# Patient Record
Sex: MALE | Race: WHITE | NOT HISPANIC OR LATINO | Employment: FULL TIME | URBAN - METROPOLITAN AREA
[De-identification: names, ages, dates, MRNs, and addresses within clinical notes are randomized per-mention and may not be internally consistent; named-entity substitution may affect disease eponyms.]

---

## 2017-02-04 ENCOUNTER — OFFICE VISIT (OUTPATIENT)
Dept: URGENT CARE | Facility: URGENT CARE | Age: 42
End: 2017-02-04
Payer: COMMERCIAL

## 2017-02-04 VITALS
OXYGEN SATURATION: 95 % | TEMPERATURE: 98.9 F | HEART RATE: 73 BPM | SYSTOLIC BLOOD PRESSURE: 116 MMHG | BODY MASS INDEX: 26.56 KG/M2 | WEIGHT: 182.4 LBS | DIASTOLIC BLOOD PRESSURE: 90 MMHG

## 2017-02-04 DIAGNOSIS — A08.4 VIRAL ENTERITIS: Primary | ICD-10-CM

## 2017-02-04 PROCEDURE — 99212 OFFICE O/P EST SF 10 MIN: CPT | Performed by: INTERNAL MEDICINE

## 2017-02-04 NOTE — PROGRESS NOTES
"Chief Complaint   Patient presents with     Urgent Care     Diarrhea     onset thurs night.  fever of 100.9 yesterday.  Fever and diarrhea subsided since am.         Initial /90 mmHg  Pulse 73  Temp(Src) 98.9  F (37.2  C) (Oral)  Wt 182 lb 6.4 oz (82.736 kg)  SpO2 95% Estimated body mass index is 26.56 kg/(m^2) as calculated from the following:    Height as of 3/16/16: 5' 9.5\" (1.765 m).    Weight as of this encounter: 182 lb 6.4 oz (82.736 kg).  BP completed using cuff size: jamal Marie CMA                                2/4/2017 10:08 AM       "

## 2017-02-04 NOTE — PATIENT INSTRUCTIONS
Viral Gastroenteritis (Adult)    Gastroenteritis is commonly called the stomach flu. It is most often caused by a virus that affects the stomach and intestinal tract and usually lasts from 2 to 7 days. Common viruses causing gastroenteritis include norovirus, rotavirus, and hepatitis A. Non-viral causes of gastroenteritis include bacteria, parasites, and toxins.  The danger from repeated vomiting or diarrhea is dehydration. This is the loss of too much fluid from the body. When this occurs, body fluids must be replaced. Antibiotics do not help with this illness because it is usually viral.Simple home treatment will be helpful.  Symptoms of viral gastroenteritis may include:    Watery, loose stools    Stomach pain or abdominal cramps    Fever and chills    Nausea and vomiting    Loss of bowel control    Headache  Home care  Gastroenteritis is transmitted by contact with the stool or vomit of an infected person. This can occur from person to person or from contact with a contaminated surface.  Follow these guidelines when caring for yourself at home:    If symptoms are severe, rest at home for the next 24 hours or until you are feeling better.    Wash your hands with soap and water or use alcohol-based  to prevent the spread of infection. Wash your hands after touching anyone who is sick.    Wash your hands or use alcohol-based  after using the toilet and before meals. Clean the toilet after each use.  Remember these tips when preparing food:    People with diarrhea should not prepare or serve food to others. When preparing foods, wash your hands before and after.    Wash your hands after using cutting boards, countertops, knives, or utensils that have been in contact with raw food.    Keep uncooked meats away from cooked and ready-to-eat foods.  Medicine  You may use acetaminophen or NSAID medicines like ibuprofen or naproxen to control fever unless another medicine was given. If you have chronic  liver or kidney disease, talk with your healthcare provider before using these medicines. Also talk with your provider if you've had a stomach ulcer or gastrointestinal bleeding. Don't give aspirin to anyone under 18 years of age who is ill with a fever. It may cause severe liver damage. Don't use NSAIDS is you are already taking one for another condition (like arthritis) or are on aspirin (such as for heart disease or after a stroke).  If medicine for vomiting or diarrhea are prescribed, take these only as directed. Do not take over-the-counter medicines for vomiting or diarrhea unless instructed by your healthcare provider.  Diet  Follow these guidelines for food:    Water and liquids are important so you don't get dehydrated. Drink a small amount at a time or suck on ice chips if you are vomiting.    If you eat, avoid fatty, greasy, spicy, or fried foods.    Don't eat dairy if you have diarrhea. This can make diarrhea worse.    Avoid tobacco, alcohol, and caffeine which may worsen symptoms.  During the first 24 hours (the first full day), follow the diet below:    Beverages. Sports drinks, soft drinks without caffeine, ginger ale, mineral water (plain or flavored), decaffeinated tea and coffee. If you are very dehydrated, sports drinks aren't a good choice. They have too much sugar and not enough electrolytes. In this case, commercially available products called oral rehydration solutions, are best.    Soups. Eat clear broth, consommé, and bouillon.    Desserts. Eat gelatin, popsicles, and fruit juice bars.  During the next 24 hours (the second day), you may add the following to the above:    Hot cereal, plain toast, bread, rolls, and crackers    Plain noodles, rice, mashed potatoes, chicken noodle or rice soup    Unsweetened canned fruit (avoid pineapple), bananas    Limit fat intake to less than 15 grams per day. Do this by avoiding margarine, butter, oils, mayonnaise, sauces, gravies, fried foods, peanut  butter, meat, poultry, and fish.    Limit fiber and avoid raw or cooked vegetables, fresh fruits (except bananas), and bran cereals.    Limit caffeine and chocolate. Don't use spices or seasonings other than salt.    Limit dairy products.    Avoid alcohol.  During the next 24 hours:    Gradually resume a normal diet as you feel better and your symptoms improve.    If at any time it starts getting worse again, go back to clear liquids until you feel better.  Follow-up care  Follow up with your healthcare provider, or as advised. Call your provider if you don't get better within 24 hours or if diarrhea lasts more than a week. Also follow up if you are unable to keep down liquids and get dehydrated. If a stool (diarrhea) sample was taken, call as directed for the results.  Call 911  Call 911 if any of these occur:    Trouble breathing    Chest pain    Confused    Severe drowsiness or trouble awakening    Fainting or loss of consciousness    Rapid heart rate    Seizure    Stiff neck  When to seek medical advice  Call your healthcare provider right away if any of these occur:    Abdominal pain that gets worse    Continued vomiting (unable to keep liquids down)    Frequent diarrhea (more than 5 times a day)    Blood in vomit or stool (black or red color)    Dark urine, reduced urine output, or extreme thirst    Weakness or dizziness    Drowsiness    Fever of 100.4 F (38 C) oral or higher that does not get better with fever medicine    New rash    0573-8199 [employer]. [address] [place] [address], [place] [address]. All rights reserved. This information is not intended as a substitute for professional medical care. Always follow your healthcare professional's instructions.

## 2017-02-04 NOTE — PROGRESS NOTES
SUBJECTIVE:  Timbo Rocha, a 41 year old male, presents for evaluation of diarrhea.  This started two days ago.  Was frequent through the night.  Associated fever 38.3 C.  Fever has not recurred in the past 24 hours. In the past 24 hours, no bowel movements.  No vomiting in the past 24 hours.  No blood or mucus in the stool.  He had some slight abdominal pain which is now reduced. He has been drinking fluids over the past several days.  Denies similar illness in the house.  No international travel.      OBJECTIVE:  /90 mmHg  Pulse 73  Temp(Src) 98.9  F (37.2  C) (Oral)  Wt 182 lb 6.4 oz (82.736 kg)  SpO2 95%  GENERAL: healthy, alert and no distress  HENT: ear canals and TM's normal and nose and mouth without ulcers or lesions  NECK: no adenopathy, no asymmetry, masses, or scars and thyroid normal to palpation  RESP: clear to auscultation and percussion bilaterally; normal I:E ratio  CV: regular rates and rhythm, normal S1 S2, no S3 or S4 and no murmur, click or rub -  ABDOMEN: soft, nontender, without hepatosplenomegaly or masses and bowel sounds normal    ASSESSMENT/PLAN:    ICD-10-CM    1. Viral enteritis A08.4 Resolving spontaneously.  BRAT diet and ADAT.           Han Busch MD

## 2017-02-04 NOTE — MR AVS SNAPSHOT
After Visit Summary   2/4/2017    Timbo Rocha    MRN: [id number]           Patient Information     Date Of Birth          1975        Visit Information        Provider Department      2/4/2017 9:45 AM Han Busch MD [facility]        Today's Diagnoses     Viral enteritis    -  1       Care Instructions      Viral Gastroenteritis (Adult)    Gastroenteritis is commonly called the stomach flu. It is most often caused by a virus that affects the stomach and intestinal tract and usually lasts from 2 to 7 days. Common viruses causing gastroenteritis include norovirus, rotavirus, and hepatitis A. Non-viral causes of gastroenteritis include bacteria, parasites, and toxins.  The danger from repeated vomiting or diarrhea is dehydration. This is the loss of too much fluid from the body. When this occurs, body fluids must be replaced. Antibiotics do not help with this illness because it is usually viral.Simple home treatment will be helpful.  Symptoms of viral gastroenteritis may include:    Watery, loose stools    Stomach pain or abdominal cramps    Fever and chills    Nausea and vomiting    Loss of bowel control    Headache  Home care  Gastroenteritis is transmitted by contact with the stool or vomit of an infected person. This can occur from person to person or from contact with a contaminated surface.  Follow these guidelines when caring for yourself at home:    If symptoms are severe, rest at home for the next 24 hours or until you are feeling better.    Wash your hands with soap and water or use alcohol-based  to prevent the spread of infection. Wash your hands after touching anyone who is sick.    Wash your hands or use alcohol-based  after using the toilet and before meals. Clean the toilet after each use.  Remember these tips when preparing food:    People with diarrhea should not prepare or serve food to others. When preparing foods, wash your hands  before and after.    Wash your hands after using cutting boards, countertops, knives, or utensils that have been in contact with raw food.    Keep uncooked meats away from cooked and ready-to-eat foods.  Medicine  You may use acetaminophen or NSAID medicines like ibuprofen or naproxen to control fever unless another medicine was given. If you have chronic liver or kidney disease, talk with your healthcare provider before using these medicines. Also talk with your provider if you've had a stomach ulcer or gastrointestinal bleeding. Don't give aspirin to anyone under 18 years of age who is ill with a fever. It may cause severe liver damage. Don't use NSAIDS is you are already taking one for another condition (like arthritis) or are on aspirin (such as for heart disease or after a stroke).  If medicine for vomiting or diarrhea are prescribed, take these only as directed. Do not take over-the-counter medicines for vomiting or diarrhea unless instructed by your healthcare provider.  Diet  Follow these guidelines for food:    Water and liquids are important so you don't get dehydrated. Drink a small amount at a time or suck on ice chips if you are vomiting.    If you eat, avoid fatty, greasy, spicy, or fried foods.    Don't eat dairy if you have diarrhea. This can make diarrhea worse.    Avoid tobacco, alcohol, and caffeine which may worsen symptoms.  During the first 24 hours (the first full day), follow the diet below:    Beverages. Sports drinks, soft drinks without caffeine, ginger ale, mineral water (plain or flavored), decaffeinated tea and coffee. If you are very dehydrated, sports drinks aren't a good choice. They have too much sugar and not enough electrolytes. In this case, commercially available products called oral rehydration solutions, are best.    Soups. Eat clear broth, consommé, and bouillon.    Desserts. Eat gelatin, popsicles, and fruit juice bars.  During the next 24 hours (the second day), you may add  the following to the above:    Hot cereal, plain toast, bread, rolls, and crackers    Plain noodles, rice, mashed potatoes, chicken noodle or rice soup    Unsweetened canned fruit (avoid pineapple), bananas    Limit fat intake to less than 15 grams per day. Do this by avoiding margarine, butter, oils, mayonnaise, sauces, gravies, fried foods, peanut butter, meat, poultry, and fish.    Limit fiber and avoid raw or cooked vegetables, fresh fruits (except bananas), and bran cereals.    Limit caffeine and chocolate. Don't use spices or seasonings other than salt.    Limit dairy products.    Avoid alcohol.  During the next 24 hours:    Gradually resume a normal diet as you feel better and your symptoms improve.    If at any time it starts getting worse again, go back to clear liquids until you feel better.  Follow-up care  Follow up with your healthcare provider, or as advised. Call your provider if you don't get better within 24 hours or if diarrhea lasts more than a week. Also follow up if you are unable to keep down liquids and get dehydrated. If a stool (diarrhea) sample was taken, call as directed for the results.  Call 911  Call 911 if any of these occur:    Trouble breathing    Chest pain    Confused    Severe drowsiness or trouble awakening    Fainting or loss of consciousness    Rapid heart rate    Seizure    Stiff neck  When to seek medical advice  Call your healthcare provider right away if any of these occur:    Abdominal pain that gets worse    Continued vomiting (unable to keep liquids down)    Frequent diarrhea (more than 5 times a day)    Blood in vomit or stool (black or red color)    Dark urine, reduced urine output, or extreme thirst    Weakness or dizziness    Drowsiness    Fever of 100.4 F (38 C) oral or higher that does not get better with fever medicine    New rash    [id number] [employer]. [address] [place] [address], [place] [address]. All rights reserved. This information is not  "intended as a substitute for professional medical care. Always follow your healthcare professional's instructions.              Follow-ups after your visit        Who to contact     If you have questions or need follow up information about today's clinic visit or your schedule please contact [facility] directly at [phone].  Normal or non-critical lab and imaging results will be communicated to you by MyChart, letter or phone within 4 business days after the clinic has received the results. If you do not hear from us within 7 days, please contact the clinic through Volteahart or phone. If you have a critical or abnormal lab result, we will notify you by phone as soon as possible.  Submit refill requests through [email or web address] or call your pharmacy and they will forward the refill request to us. Please allow 3 business days for your refill to be completed.          Additional Information About Your Visit        MyChart Information     [email or web address] lets you send messages to your doctor, view your test results, renew your prescriptions, schedule appointments and more. To sign up, go to [email or web address] . Click on \"Log in\" on the left side of the screen, which will take you to the Welcome page. Then click on \"Sign up Now\" on the right side of the page.     You will be asked to enter the access code listed below, as well as some personal information. Please follow the directions to create your username and password.     Your access code is: [email or web address]  Expires: 2017 10:31 AM     Your access code will  in 90 days. If you need help or a new code, please call your [place] clinic or [phone].        Care EveryWhere ID     This is your Care EveryWhere ID. This could be used by other organizations to access your [place] medical records  [id number]        Your Vitals Were     Pulse Temperature Pulse Oximetry             73 98.9  F (37.2  C) (Oral) 95%          Blood Pressure from Last 3 " Encounters:   02/04/17 116/90   03/16/16 114/74   09/13/15 110/70    Weight from Last 3 Encounters:   02/04/17 182 lb 6.4 oz (82.736 kg)   03/16/16 179 lb 1.6 oz (81.239 kg)   09/13/15 176 lb (79.833 kg)              Today, you had the following     No orders found for display       Primary Care Provider    None       No address on file        Thank you!     Thank you for choosing [facility]  for your care. Our goal is always to provide you with excellent care. Hearing back from our patients is one way we can continue to improve our services. Please take a few minutes to complete the written survey that you may receive in the mail after your visit with us. Thank you!             Your Updated Medication List - Protect others around you: Learn how to safely use, store and throw away your medicines at www.disposemymeds.org.          This list is accurate as of: 2/4/17 10:31 AM.  Always use your most recent med list.                   Brand Name Dispense Instructions for use    temazepam 15 MG capsule    RESTORIL    30 capsule    Take by mouth nightly as needed for sleep

## 2017-11-08 ENCOUNTER — OFFICE VISIT (OUTPATIENT)
Dept: PEDIATRICS | Facility: CLINIC | Age: 42
End: 2017-11-08
Payer: COMMERCIAL

## 2017-11-08 VITALS
HEIGHT: 69 IN | OXYGEN SATURATION: 98 % | TEMPERATURE: 97.6 F | DIASTOLIC BLOOD PRESSURE: 76 MMHG | SYSTOLIC BLOOD PRESSURE: 128 MMHG | HEART RATE: 55 BPM | BODY MASS INDEX: 26.72 KG/M2 | WEIGHT: 180.4 LBS

## 2017-11-08 DIAGNOSIS — F51.01 PRIMARY INSOMNIA: Primary | ICD-10-CM

## 2017-11-08 PROCEDURE — 99213 OFFICE O/P EST LOW 20 MIN: CPT | Mod: GE | Performed by: INTERNAL MEDICINE

## 2017-11-08 RX ORDER — TEMAZEPAM 15 MG/1
15 CAPSULE ORAL
Qty: 30 CAPSULE | Refills: 0 | Status: SHIPPED | OUTPATIENT
Start: 2017-11-08 | End: 2023-10-10

## 2017-11-08 NOTE — NURSING NOTE
"Chief Complaint   Patient presents with     Sleep Problem       Initial /76 (BP Location: Right arm, Patient Position: Chair, Cuff Size: Adult Regular)  Pulse 55  Temp 97.6  F (36.4  C) (Tympanic)  Ht 5' 9.25\" (1.759 m)  Wt 180 lb 6.4 oz (81.8 kg)  SpO2 98%  BMI 26.45 kg/m2 Estimated body mass index is 26.45 kg/(m^2) as calculated from the following:    Height as of this encounter: 5' 9.25\" (1.759 m).    Weight as of this encounter: 180 lb 6.4 oz (81.8 kg).  Medication Reconciliation: complete   Linda Blackmon LPN      "

## 2017-11-08 NOTE — MR AVS SNAPSHOT
"              After Visit Summary   2017    Timbo Rocha    MRN: [id number]           Patient Information     Date Of Birth          1975        Visit Information        Provider Department      2017 11:00 AM Darius Cm MD [facility]        Today's Diagnoses     Primary insomnia    -  1      Care Instructions    Sleep:  - Temazepam refilled today    Follow up for physical in 2018 for cholesterol & tetanus vaccine          Follow-ups after your visit        Who to contact     If you have questions or need follow up information about today's clinic visit or your schedule please [facility] directly at [phone].  Normal or non-critical lab and imaging results will be communicated to you by LS9hart, letter or phone within 4 business days after the clinic has received the results. If you do not hear from us within 7 days, please contact the clinic through LS9hart or phone. If you have a critical or abnormal lab result, we will notify you by phone as soon as possible.  Submit refill requests through [employer] or call your pharmacy and they will forward the refill request to us. Please allow 3 business days for your refill to be completed.          Additional Information About Your Visit        MyChart Information     Payoff lets you send messages to your doctor, view your test results, renew your prescriptions, schedule appointments and more. To sign up, go to [email or web address] . Click on \"Log in\" on the left side of the screen, which will take you to the Welcome page. Then click on \"Sign up Now\" on the right side of the page.     You will be asked to enter the access code listed below, as well as some personal information. Please follow the directions to create your username and password.     Your access code is: [email or web address]  Expires: 2018 11:20 AM     Your access code will  in 90 days. If you need help or a new code, please call your [place] " "clinic or [phone].        Care EveryWhere ID     This is your Care EveryWhere ID. This could be used by other organizations to access your [place] medical records  [id number]        Your Vitals Were     Pulse Temperature Height Pulse Oximetry BMI (Body Mass Index)       55 97.6  F (36.4  C) (Tympanic) 5' 9.25\" (1.759 m) 98% 26.45 kg/m2        Blood Pressure from Last 3 Encounters:   11/08/17 128/76   02/04/17 116/90   03/16/16 114/74    Weight from Last 3 Encounters:   11/08/17 180 lb 6.4 oz (81.8 kg)   02/04/17 182 lb 6.4 oz (82.7 kg)   03/16/16 179 lb 1.6 oz (81.2 kg)              Today, you had the following     No orders found for display         Today's Medication Changes          These changes are accurate as of: 11/8/17 11:20 AM.  If you have any questions, ask your nurse or doctor.               These medicines have changed or have updated prescriptions.        Dose/Directions    temazepam 15 MG capsule   Commonly known as:  RESTORIL   This may have changed:  how much to take   Used for:  Primary insomnia   Changed by:  Darius Cm MD        Dose:  15 mg   Take 1 capsule (15 mg) by mouth nightly as needed for sleep   Quantity:  30 capsule   Refills:  0            Where to get your medicines      Some of these will need a paper prescription and others can be bought over the counter.  Ask your nurse if you have questions.     Bring a paper prescription for each of these medications     temazepam 15 MG capsule                Primary Care Provider Office Phone # Fax #    [facility] [phone] [address] [facility] [address]        Equal Access to Services     LORENA JIMENEZ AH: Hadfidel Kilgore, hermelinda ruiz, qablanquita henao. So [facility] [phone].    ATENCIÓN: Si habla español, tiene a harrell disposición servicios gratuitos de asistencia lingüística. Llame al [phone].    We comply with " applicable federal civil rights laws and [place] laws. We do not discriminate on the basis of race, color, national origin, age, disability, sex, sexual orientation, or gender identity.            Thank you!     Thank you for choosing [facility]  for your care. Our goal is always to provide you with excellent care. Hearing back from our patients is one way we can continue to improve our services. Please take a few minutes to complete the written survey that you may receive in the mail after your visit with us. Thank you!             Your Updated Medication List - Protect others around you: Learn how to safely use, store and throw away your medicines at www.disposemymeds.org.          This list is accurate as of: 11/8/17 11:20 AM.  Always use your most recent med list.                   Brand Name Dispense Instructions for use Diagnosis    temazepam 15 MG capsule    RESTORIL    30 capsule    Take 1 capsule (15 mg) by mouth nightly as needed for sleep    Primary insomnia

## 2017-11-08 NOTE — PATIENT INSTRUCTIONS
Sleep:  - Temazepam refilled today    Follow up for physical in 2018 for cholesterol & tetanus vaccine

## 2017-11-08 NOTE — PROGRESS NOTES
"  SUBJECTIVE:   Timbo Rocha is a 42 year old male who presents to clinic today for the following health issues:      Medication Followup of Temazepam    Taking Medication as prescribed: yes    Side Effects:  None    Medication Helping Symptoms:  yes     Most nights feels sleeping is going well. Temazepam prescribed 10 years ago. Once in a while requires temazepam. Can fall asleep well, but issues staying asleep. Rarely happens.    Initially prescribed medication from 10 years ago. Last refilled 3/45403. Given 30 tablet & has 10 left in his bottle today, which has .      Patient recently completed sleep study. Snoring, but AHI was 2.2 events/hour. Recommended sleep wedge/breath right strips.    Problem list and histories reviewed & adjusted, as indicated.  Additional history: as documented    Patient Active Problem List   Diagnosis     CARDIOVASCULAR SCREENING; LDL GOAL LESS THAN 160     Past Surgical History:   Procedure Laterality Date     APPENDECTOMY         Social History   Substance Use Topics     Smoking status: Never Smoker     Smokeless tobacco: Never Used     Alcohol use No     Family History   Problem Relation Age of Onset     DIABETES No family hx of          Reviewed and updated as needed this visit by clinical staffTobacco  Allergies  Meds  Med Hx  Surg Hx  Fam Hx  Soc Hx      Reviewed and updated as needed this visit by Provider         ROS:  Constitutional, HEENT, cardiovascular, pulmonary, gi and gu systems are negative, except as otherwise noted.    OBJECTIVE:   /76 (BP Location: Right arm, Patient Position: Chair, Cuff Size: Adult Regular)  Pulse 55  Temp 97.6  F (36.4  C) (Tympanic)  Ht 5' 9.25\" (1.759 m)  Wt 180 lb 6.4 oz (81.8 kg)  SpO2 98%  BMI 26.45 kg/m2  Body mass index is 26.45 kg/(m^2).  GENERAL: healthy, alert and no distress  EYES: Eyes grossly normal to inspection, PERRL and conjunctivae and sclerae normal  HENT: ear canals and TM's normal, nose and mouth " without ulcers or lesions  NECK: no adenopathy, no asymmetry, masses, or scars and thyroid normal to palpation  RESP: lungs clear to auscultation - no rales, rhonchi or wheezes  CV: regular rate and rhythm, normal S1 S2, no murmurs, click or rub, no peripheral edema and peripheral pulses strong  ABDOMEN: soft, nontender, non-distended  MS: no gross musculoskeletal defects noted, no edema  SKIN: no suspicious lesions or rashes    Diagnostic Test Results:  none     ASSESSMENT/PLAN:   Timbo Rocha is a 42 year old with hx of difficulties with sleep here for Temazepam refill. Patient using it intermittently with good relief & without signs of abuse. Reasonable to continue.     1. Primary insomnia  - temazepam (RESTORIL) 15 MG capsule; Take 1 capsule (15 mg) by mouth nightly as needed for sleep  Dispense: 30 capsule; Refill: 0  - Reviewed sleep hygeine    Follow up by June 2018 for cholesterol & TDaP vaccine. Patient agreeable to plan.    Darius Cm MD  [facility]      I discussed this case in depth with Dr. Cm and agree with the key components of the history, assessment and plan.      Ela France MD  Internal Medicine/Pediatrics

## 2023-10-10 ENCOUNTER — OFFICE VISIT (OUTPATIENT)
Dept: FAMILY MEDICINE | Facility: CLINIC | Age: 48
End: 2023-10-10

## 2023-10-10 VITALS
OXYGEN SATURATION: 96 % | TEMPERATURE: 98 F | SYSTOLIC BLOOD PRESSURE: 128 MMHG | HEART RATE: 70 BPM | BODY MASS INDEX: 29.18 KG/M2 | HEIGHT: 69 IN | WEIGHT: 197 LBS | DIASTOLIC BLOOD PRESSURE: 80 MMHG

## 2023-10-10 DIAGNOSIS — Z71.89 ACP (ADVANCE CARE PLANNING): ICD-10-CM

## 2023-10-10 DIAGNOSIS — R19.8 CHANGE IN BOWEL MOVEMENT: Primary | ICD-10-CM

## 2023-10-10 DIAGNOSIS — Z76.89 HEALTH CARE HOME: ICD-10-CM

## 2023-10-10 PROCEDURE — 99202 OFFICE O/P NEW SF 15 MIN: CPT | Performed by: PHYSICIAN ASSISTANT

## 2023-10-10 NOTE — PROGRESS NOTES
"CC: Parasite?    History:  Ed is here today after seeing what looked like a worm in his bowel movement. This happened 1 week ago, and he has not noted since. Estimates worm was over 1 in in length, but unsure of color. Has had some abdominal \"tenderness\" at times across lower abdomen. May correlate to BMs, but unsure. No fever, sweats, chills, diarrhea, blood in stool.     No recent travel, but did do a trip through [place] 6 month ago- [place], [place], [place], [place]. Does live with a cat.     PMH, MEDICATIONS, ALLERGIES, SOCIAL AND FAMILY HISTORY in [facility] and reviewed by me personally.    ROS negative other than the symptoms noted above in the HPI.    Examination   /80 (BP Location: Left arm, Patient Position: Sitting, Cuff Size: Adult Large)   Pulse 70   Temp 98  F (36.7  C) (Temporal)   Ht 1.753 m (5' 9\")   Wt 89.4 kg (197 lb)   SpO2 96%   BMI 29.09 kg/m       Constitutional: Sitting comfortably, in no acute distress. Vital signs noted  Mouth and throat: without erythema or lesions of the mucosa  Neck:  no adenopathy, trachea midline and normal to palpation, thyroid normal to palpation  Cardiovascular:  regular rate and rhythm, no murmurs, clicks, or gallops  Respiratory:  normal respiratory rate and rhythm, lungs clear to auscultation  Abdomen: Abdomen soft, non-tender. BS normal. No masses, organomegaly  SKIN: No jaundice/pallor/rash.   Psychiatric: mentation appears normal and affect normal/bright    A/P    ICD-10-CM    1. Change in bowel movement  R19.8 OVA AND PARASITES ([facility])      2. ACP (advance care planning)  Z71.89 OVA AND PARASITES ([facility])      3. Health Care Home  Z76.89 OVA AND PARASITES ([facility])          DISCUSSION:  Agree pt's observation of a worm is suspicious for a parasitic infection. However, also explained that partially digested or undigested foods or fibers can appear worm like in stool. Agreed to complete O&P testing. Will contact pt with result, and determine whether " antiparasite treatment is necessary. In the meantime, monitor for further worms in BM, and even consider taking picture if noted.     follow up visit: As needed    Debi Gaming PA-C  [facility]

## 2023-10-11 DIAGNOSIS — R19.8 CHANGE IN BOWEL MOVEMENT: ICD-10-CM

## 2023-10-11 DIAGNOSIS — Z76.89 HEALTH CARE HOME: ICD-10-CM

## 2023-10-11 DIAGNOSIS — Z71.89 ACP (ADVANCE CARE PLANNING): ICD-10-CM

## 2023-10-11 PROCEDURE — 36415 COLL VENOUS BLD VENIPUNCTURE: CPT | Performed by: PHYSICIAN ASSISTANT

## 2023-10-16 LAB — TRICHROME - QUEST: NORMAL

## 2023-10-17 ENCOUNTER — TELEPHONE (OUTPATIENT)
Dept: FAMILY MEDICINE | Facility: CLINIC | Age: 48
End: 2023-10-17

## 2023-10-17 DIAGNOSIS — R19.8 CHANGE IN BOWEL MOVEMENT: Primary | ICD-10-CM

## 2023-10-17 NOTE — TELEPHONE ENCOUNTER
Called and left message for pt. Informed of negative O/P test. Asked if he had seen any further evidence. Would recommend further monitoring as what he saw may have been unprocessed food/fiber. However, if he strongly wants to do the treatment would be open to one time dose of albendazole for intestinal worm.

## 2023-10-19 RX ORDER — ALBENDAZOLE 200 MG/1
400 TABLET, FILM COATED ORAL ONCE
Qty: 2 TABLET | Refills: 0 | Status: SHIPPED | OUTPATIENT
Start: 2023-10-19 | End: 2023-10-19

## 2023-10-19 NOTE — TELEPHONE ENCOUNTER
Pt called back he wants to proceed with the treatment for tapeworm. Preferred pharmacy [facility] [place].    Please advise # [phone]    Thanks, Debora KERN

## 2023-10-19 NOTE — TELEPHONE ENCOUNTER
Called and left message for Ed. Recommended Albendazole 400 mg once. Take with food, ideally with some fat content. Monitor for side effects. No further concerns.

## 2024-04-19 ENCOUNTER — OFFICE VISIT (OUTPATIENT)
Dept: FAMILY MEDICINE | Facility: CLINIC | Age: 49
End: 2024-04-19

## 2024-04-19 VITALS
HEIGHT: 69 IN | WEIGHT: 203 LBS | BODY MASS INDEX: 30.07 KG/M2 | TEMPERATURE: 98.1 F | DIASTOLIC BLOOD PRESSURE: 84 MMHG | RESPIRATION RATE: 20 BRPM | HEART RATE: 60 BPM | SYSTOLIC BLOOD PRESSURE: 128 MMHG

## 2024-04-19 DIAGNOSIS — L73.9 FOLLICULITIS: Primary | ICD-10-CM

## 2024-04-19 DIAGNOSIS — G47.09 OTHER INSOMNIA: ICD-10-CM

## 2024-04-19 DIAGNOSIS — Z23 NEED FOR VACCINATION: ICD-10-CM

## 2024-04-19 PROCEDURE — 90471 IMMUNIZATION ADMIN: CPT | Performed by: PHYSICIAN ASSISTANT

## 2024-04-19 PROCEDURE — 99214 OFFICE O/P EST MOD 30 MIN: CPT | Mod: 25 | Performed by: PHYSICIAN ASSISTANT

## 2024-04-19 PROCEDURE — 90715 TDAP VACCINE 7 YRS/> IM: CPT | Performed by: PHYSICIAN ASSISTANT

## 2024-04-19 RX ORDER — CLINDAMYCIN PHOSPHATE 10 UG/ML
LOTION TOPICAL 2 TIMES DAILY
Qty: 60 ML | Refills: 0 | Status: SHIPPED | OUTPATIENT
Start: 2024-04-19

## 2024-04-19 RX ORDER — TRAZODONE HYDROCHLORIDE 50 MG/1
50 TABLET, FILM COATED ORAL
Qty: 30 TABLET | Refills: 1 | Status: SHIPPED | OUTPATIENT
Start: 2024-04-19

## 2024-04-19 NOTE — PROGRESS NOTES
"  Assessment & Plan     Need for vaccination  Given today  - TDAP VACCINE (Adacel, Boostrix)  [[id number]]    Other insomnia-pt open to trazodone instead of Restoril, will trial this and await improvement  Consider short fill of Restoril if insomnia continues  Try 1/2 tab trazodone, may use up to 2 tablets  - traZODone (DESYREL) 50 MG tablet  Dispense: 30 tablet; Refill: 1    Folliculitis-no systemic signs, given small lesion will trial topical AB and await improvement  Call if any worsening-consider oral AB if concerns for spreading redness/cellulitis  - clindamycin (CLEOCIN T) 1 % external lotion  Dispense: 60 mL; Refill: 0          Follow up as needed    No follow-ups on file.    Subjective   Ed is a 48 year old, presenting for the following health issues:  Medication Request (insomnia) and Derm Problem (Thigh, for past week. Painful and redness)    HPI     Pt has concerns about insomnia-was on Restoril 5 years ago for sporadic insomnia. Used 7.5mg, lower dose. Uses 1 dose monthly approximately, uses only if he can't sleep 1 night and uses the night after to control sleep. Has had a few nights of poor sleep recently, prompting him to get a new Rx. Has more issues staying asleep vs falling asleep. Happening a few times a month at this point. Open to trying Trazodone, hasn't tried this before.    Has concerns for possible infected pimple on L thigh. Ongoing for 1 week. Painful and red, concerns this is worsening. No trauma to the area. No fevers/chills. Unsure if pants were rubbing on this area. No hot tubs or pools recently.       Review of Systems  Constitutional, neuro, ENT, endocrine, pulmonary, cardiac, gastrointestinal, genitourinary, musculoskeletal, integument and psychiatric systems are negative, except as otherwise noted.      Objective    /84 (BP Location: Right arm, Patient Position: Chair, Cuff Size: Adult Regular)   Pulse 60   Temp 98.1  F (36.7  C) (Temporal)   Resp 20   Ht 1.753 m (5' 9\")   " "Wt 92.1 kg (203 lb)   BMI 29.98 kg/m    Body mass index is 29.98 kg/m .  Physical Exam   GENERAL: alert and no distress  NECK: no adenopathy, no asymmetry, masses, or scars  RESP: lungs clear to auscultation - no rales, rhonchi or wheezes  CV: regular rate and rhythm, normal S1 S2, no S3 or S4, no murmur, click or rub, no peripheral edema  ABDOMEN: soft, nontender, no hepatosplenomegaly, no masses and bowel sounds normal  MS: no gross musculoskeletal defects noted, no edema  SKIN: L inner thigh: raised, irriated appearing 1/4\" lesion noted, soft, no pus noted  PSYCH: mentation appears normal, affect normal/bright            Signed Electronically by: Darrell Fowler PA-C      "

## 2024-04-19 NOTE — NURSING NOTE
Timbo Rocha is here for a possible cyst on left thigh and also consult for insomnia. Sometimes has issues with staying asleep at times.    Questioned patient about current smoking habits.  Pt. has never smoked.  PULSE regular  My Chart: declines  CLASSIFICATION OF OVERWEIGHT AND OBESITY BY BMI                        Obesity Class           BMI(kg/m2)  Underweight                                    < 18.5  Normal                                         18.5-24.9  Overweight                                     25.0-29.9  OBESITY                     I                  30.0-34.9                             II                 35.0-39.9  EXTREME OBESITY             III                >40                            Patient's  BMI Body mass index is 29.98 kg/m .  http://hin.nhlbi.nih.gov/menuplanner/menu.cgi  Pre-visit planning  Immunizations - tdap  Colonoscopy - is due and to be scheduled by patient for later completion  Mammogram -   Asthma -   PHQ9 -    DARON-7 -      The patient has verbalized that it is ok to leave a detailed voice message on the patient's cell phone with results/recommendations from this visit.

## 2024-06-17 PROBLEM — Z76.89 HEALTH CARE HOME: Status: RESOLVED | Noted: 2023-10-10 | Resolved: 2024-06-17

## 2025-04-23 DIAGNOSIS — G47.09 OTHER INSOMNIA: ICD-10-CM

## 2025-04-23 RX ORDER — TRAZODONE HYDROCHLORIDE 50 MG/1
50 TABLET ORAL
COMMUNITY
Start: 2025-04-23

## 2025-04-23 NOTE — TELEPHONE ENCOUNTER
Refused Prescriptions:                       Disp   Refills    traZODone (DESYREL) 50 MG tablet [Pharmacy*                Sig: TAKE 1 TABLET(50 MG) BY MOUTH EVERY NIGHT AS NEEDED           FOR SLEEP  Refused By: NIECY GUILLEN  Reason for Refusal: Patient needs appointment    Pt is due for a yearly ov med check  Last ov was 4-  Niecy

## 2025-05-01 ENCOUNTER — OFFICE VISIT (OUTPATIENT)
Dept: FAMILY MEDICINE | Facility: CLINIC | Age: 50
End: 2025-05-01

## 2025-05-01 VITALS
BODY MASS INDEX: 28.8 KG/M2 | WEIGHT: 195 LBS | SYSTOLIC BLOOD PRESSURE: 130 MMHG | OXYGEN SATURATION: 98 % | DIASTOLIC BLOOD PRESSURE: 80 MMHG | HEART RATE: 55 BPM | TEMPERATURE: 97.1 F

## 2025-05-01 DIAGNOSIS — G47.09 OTHER INSOMNIA: ICD-10-CM

## 2025-05-01 RX ORDER — TRAZODONE HYDROCHLORIDE 50 MG/1
50 TABLET ORAL
Qty: 90 TABLET | Refills: 1 | Status: SHIPPED | OUTPATIENT
Start: 2025-05-01

## 2025-05-01 NOTE — PROGRESS NOTES
CC: Medication Check    History:  Insomnia:  Pt was last here 4/2024. Used to take temazepam, but started trial of trazodone at that time, which has been working quite well. Takes trazodone 50 mg nightly only as needed, which isn't every night. Denies any side effects.     PMH, MEDICATIONS, ALLERGIES, SOCIAL AND FAMILY HISTORY in [facility] and reviewed by me personally.    ROS negative other than the symptoms noted above in the HPI.      Examination   /80 (BP Location: Left arm, Patient Position: Sitting, Cuff Size: Adult Large)   Pulse 55   Temp 97.1  F (36.2  C) (Temporal)   Wt 88.5 kg (195 lb)   SpO2 98%   BMI 28.80 kg/m       Constitutional: Sitting comfortably, in no acute distress. Vital signs noted  Eyes: pupils equal round reactive to light and accomodation, extra ocular movements intact  Ears: external canals and TMs free of abnormalities  Nose: patent, without mucosal abnormalities  Mouth and throat: without erythema or lesions of the mucosa  Neck:  no adenopathy, trachea midline and normal to palpation, thyroid normal to palpation  Cardiovascular:  regular rate and rhythm, no murmurs, clicks, or gallops  Respiratory:  normal respiratory rate and rhythm, lungs clear to auscultation  SKIN: No jaundice/pallor/rash.   Psychiatric: mentation appears normal and affect normal/bright        A/P    ICD-10-CM    1. Other insomnia  G47.09           DISCUSSION:  Insomnia:  Pt doing well on medication. Encouraged him to continue using on as needed basis. Will refill medication without change for 1 year.     follow up visit: 1 year, recommended fasting px pt will consider but declines    Debi Gaming PA-C  [facility]

## 2025-05-01 NOTE — NURSING NOTE
Chief Complaint   Patient presents with    Recheck Medication     Non-fasting med check and refill      Pre-visit Screening:  Immunizations:  up to date  Colonoscopy:  declined  Mammogram: na  Asthma Action Test/Plan:  na  PHQ9:  phq2 given  GAD7:  na  Questioned patient about current smoking habits Pt. has never smoked.  Ok to leave detailed message on voice mail for today's visit only yes, phone # [phone] (home)